# Patient Record
Sex: FEMALE | Race: WHITE
[De-identification: names, ages, dates, MRNs, and addresses within clinical notes are randomized per-mention and may not be internally consistent; named-entity substitution may affect disease eponyms.]

---

## 2020-08-24 ENCOUNTER — EMPLOYEE WELLNESS (OUTPATIENT)
Dept: OTHER | Facility: CLINIC | Age: 35
End: 2020-08-24

## 2020-08-24 LAB
CHOLEST SERPL-MCNC: 160 MG/DL
GLUCOSE SERPL-MCNC: 85 MG/DL (ref 65–100)
HDLC SERPL-MCNC: 69 MG/DL
LDLC SERPL CALC-MCNC: 80.2 MG/DL (ref 0–100)
TRIGL SERPL-MCNC: 54 MG/DL (ref ?–150)

## 2021-03-29 ENCOUNTER — NURSE TRIAGE (OUTPATIENT)
Dept: OTHER | Facility: CLINIC | Age: 36
End: 2021-03-29

## 2021-03-29 NOTE — TELEPHONE ENCOUNTER
Reason for Disposition   Injury interferes with work or school    Answer Assessment - Initial Assessment Questions  1. MECHANISM: \"How did the injury happen? \"      Patient grabbed her arm while she was securing his restraints    2. ONSET: \"When did the injury happen? \" (Minutes or hours ago)     3/29/21 10:30am    3. LOCATION: \"Where is the injury located? \"   Left arm    4. APPEARANCE of INJURY: \"What does the injury look like? \"   Bruise left outer wrist, mild swelling    5. SEVERITY: \"Can you use the arm normally? \"   Yes    6. SWELLING or BRUISING: \"is there any swelling or bruising? \" If so, ask: \"How large is it? (e.g., inches, centimeters)   Yes and yes, bruise is 2 inches    7. PAIN: \"Is there pain? \" If so, ask: \"How bad is the pain? \"    (Scale 1-10; or mild, moderate, severe)  Mild    8. TETANUS: For any breaks in the skin, ask: \"When was the last tetanus booster? \"   unsure    9. OTHER SYMPTOMS: \"Do you have any other symptoms? \"  (e.g., numbness in hand)     No    10. PREGNANCY: \"Is there any chance you are pregnant? \" \"When was your last menstrual period? \"   no, 3/9/21    Protocols used: ARM INJURY-ADULT-[place]  Location of employment: [employer]    Location of injury (body part involved):left arm     Time of injury: 10:30am 3/29/21    Last 4 of N: 6508    Triage indicates for caller to be seen today or tomorrow. paperwork sent via e=mail    Caller directed to [facility]    Care advice provided, caller verbalizes understanding; denies any other questions or concerns.

## 2023-01-18 ENCOUNTER — OFFICE VISIT (OUTPATIENT)
Dept: ORTHOPEDIC SURGERY | Age: 38
End: 2023-01-18
Payer: OTHER GOVERNMENT

## 2023-01-18 VITALS — WEIGHT: 163 LBS | HEIGHT: 66 IN | BODY MASS INDEX: 26.2 KG/M2

## 2023-01-18 DIAGNOSIS — M77.8 LEFT SHOULDER TENDINITIS: ICD-10-CM

## 2023-01-18 DIAGNOSIS — M75.02 ADHESIVE CAPSULITIS OF LEFT SHOULDER: ICD-10-CM

## 2023-01-18 DIAGNOSIS — M25.512 ACUTE PAIN OF LEFT SHOULDER: Primary | ICD-10-CM

## 2023-01-18 NOTE — PROGRESS NOTES
Enrique Diop (: 1985) is a 42 y.o. female, patient, here for evaluation of the following chief complaint(s):  Shoulder Pain (Left shoulder pain)       HPI:    38-year-old female presents with chief complaint of left shoulder pain. She states that over the past 6 months she has had increasing pain in her left shoulder that has now progressed to the point where she is lost some of her range of motion. She now has troubles with her activities of daily living. The patient is a nurse in the Cath Lab at [employer] and does have trouble doing her job requirements because of the pain. She denies any previous injuries or surgeries to the left shoulder. Not on File    No current outpatient medications on file. No current facility-administered medications for this visit. No past medical history on file. No past surgical history on file. No family history on file. Social History     Socioeconomic History    Marital status:      Spouse name: Not on file    Number of children: Not on file    Years of education: Not on file    Highest education level: Not on file   Occupational History    Not on file   Tobacco Use    Smoking status: Not on file    Smokeless tobacco: Not on file   Substance and Sexual Activity    Alcohol use: Not on file    Drug use: Not on file    Sexual activity: Not on file   Other Topics Concern    Not on file   Social History Narrative    Not on file     Social Determinants of Health     Financial Resource Strain: Not on file   Food Insecurity: Not on file   Transportation Needs: Not on file   Physical Activity: Not on file   Stress: Not on file   Social Connections: Not on file   Intimate Partner Violence: Not on file   Housing Stability: Not on file       Review of Systems   Musculoskeletal:         Left shoulder pain     Vitals:  Ht 5' 6\" (1.676 m)   Wt 163 lb (73.9 kg)   BMI 26.31 kg/m²    Body mass index is 26.31 kg/m².     Ortho Exam     Patient is alert and oriented x3. Patient is in no acute distress. Patient ambulates with a nonantalgic gait. Left shoulder: No shoulder girdle atrophy. No swelling or erythema. Active and passive forward flexion of 140 degrees, lateral abduction of 80 degrees, and external rotation of 50 degrees with pain at the end of range of motion. 5/5 strength with forward flexion, lateral abduction, internal rotation, and external rotation. Full cervical spine range of motion with negative Spurling sign. Neurovascular exam is normal.    Right shoulder:  No shoulder girdle atrophy . There is no soft tissue swelling, ecchymosis, abrasions or lacerations. Active range of motion is full with forward flexion, lateral abduction and external rotation. Internal rotation is to the upper lumbar level with a negative lift-off sign. Passive range of motion is full with a negative impingement sign and a negative Serrano sign. Rotator cuff strength with forward flexion, lateral abduction and external rotation is intact with 5/5 strength. There is no crepitation about the joint. Palpation of the [facility] joint does not reproduce discomfort, and there is no pain elicited with cross-body adduction. Strength of the extremity is 5/5 at biceps/triceps/wrist extension. DRT's are intact at +2/4 and  symmetrical.  Cervical range of motion is full with no pain to palpation along the paraspinal musculature medial border of the scapula. Spurling's sign is negative. XR Results (most recent):  Results from Appointment encounter on 01/18/23    XR SHOULDER LT AP/LAT MIN 2 V    Narrative  Three-view x-ray of the left shoulder reveals no evidence of fracture dislocation no signs of osteoarthritis. Artifact is noted along the x-ray consistent with clothing on the patient       ASSESSMENT/PLAN:    We discussed with the patient that she may have early signs of adhesive capsulitis in her left shoulder.   There may also be a component of rotator cuff tendinitis or even cervical radiculitis but our leading diagnosis at this time is adhesive capsulitis. We went over treatment options with the patient and we decided on 4 weeks of formalized physical therapy to work on range of motion of her left shoulder. We discussed also the appropriate use of anti-inflammatory medicine. We wrote her prescription for physical therapy today. She will follow-up in 4 weeks for reevaluation.         Luda Mejia MD

## 2023-01-18 NOTE — LETTER
1/18/2023    Patient: Davie Fontenot   YOB: 1985   Date of Visit: 1/18/2023     Shilpa Bella, [address] [address]  Via Fax: [phone]    Dear Shilpa Bella MD,      Thank you for referring Ms. Davie Fontenot to [facility] for evaluation. My notes for this consultation are attached. If you have questions, please do not hesitate to call me. I look forward to following your patient along with you.       Sincerely,    Harjeet Mckenzie MD